# Patient Record
Sex: MALE | Race: ASIAN | NOT HISPANIC OR LATINO | URBAN - METROPOLITAN AREA
[De-identification: names, ages, dates, MRNs, and addresses within clinical notes are randomized per-mention and may not be internally consistent; named-entity substitution may affect disease eponyms.]

---

## 2021-02-24 ENCOUNTER — INPATIENT (INPATIENT)
Facility: HOSPITAL | Age: 36
LOS: 1 days | Discharge: HOME | DRG: 100 | End: 2021-02-26
Attending: PSYCHIATRY & NEUROLOGY | Admitting: PSYCHIATRY & NEUROLOGY
Payer: COMMERCIAL

## 2021-02-24 VITALS
WEIGHT: 145.06 LBS | HEART RATE: 88 BPM | HEIGHT: 65 IN | DIASTOLIC BLOOD PRESSURE: 70 MMHG | RESPIRATION RATE: 18 BRPM | TEMPERATURE: 98 F | SYSTOLIC BLOOD PRESSURE: 114 MMHG | OXYGEN SATURATION: 96 %

## 2021-02-24 DIAGNOSIS — R56.9 UNSPECIFIED CONVULSIONS: ICD-10-CM

## 2021-02-24 LAB
ALBUMIN SERPL ELPH-MCNC: 3.8 G/DL — SIGNIFICANT CHANGE UP (ref 3.3–5)
ALP SERPL-CCNC: 67 U/L — SIGNIFICANT CHANGE UP (ref 40–120)
ALT FLD-CCNC: 26 U/L — SIGNIFICANT CHANGE UP (ref 10–45)
AMPHET UR-MCNC: NEGATIVE — SIGNIFICANT CHANGE UP
ANION GAP SERPL CALC-SCNC: 12 MMOL/L — SIGNIFICANT CHANGE UP (ref 5–17)
APTT BLD: 37.7 SEC — HIGH (ref 27.5–35.5)
AST SERPL-CCNC: 32 U/L — SIGNIFICANT CHANGE UP (ref 10–40)
BARBITURATES UR SCN-MCNC: NEGATIVE — SIGNIFICANT CHANGE UP
BASOPHILS # BLD AUTO: 0.02 K/UL — SIGNIFICANT CHANGE UP (ref 0–0.2)
BASOPHILS NFR BLD AUTO: 0.5 % — SIGNIFICANT CHANGE UP (ref 0–2)
BENZODIAZ UR-MCNC: NEGATIVE — SIGNIFICANT CHANGE UP
BILIRUB SERPL-MCNC: 0.6 MG/DL — SIGNIFICANT CHANGE UP (ref 0.2–1.2)
BUN SERPL-MCNC: 17 MG/DL — SIGNIFICANT CHANGE UP (ref 7–23)
CALCIUM SERPL-MCNC: 9.2 MG/DL — SIGNIFICANT CHANGE UP (ref 8.4–10.5)
CHLORIDE SERPL-SCNC: 102 MMOL/L — SIGNIFICANT CHANGE UP (ref 96–108)
CO2 SERPL-SCNC: 24 MMOL/L — SIGNIFICANT CHANGE UP (ref 22–31)
COCAINE METAB.OTHER UR-MCNC: NEGATIVE — SIGNIFICANT CHANGE UP
CREAT SERPL-MCNC: 0.86 MG/DL — SIGNIFICANT CHANGE UP (ref 0.5–1.3)
EOSINOPHIL # BLD AUTO: 0.03 K/UL — SIGNIFICANT CHANGE UP (ref 0–0.5)
EOSINOPHIL NFR BLD AUTO: 0.8 % — SIGNIFICANT CHANGE UP (ref 0–6)
FOLATE SERPL-MCNC: 16.2 NG/ML — SIGNIFICANT CHANGE UP
GLUCOSE SERPL-MCNC: 84 MG/DL — SIGNIFICANT CHANGE UP (ref 70–99)
HCT VFR BLD CALC: 43.1 % — SIGNIFICANT CHANGE UP (ref 39–50)
HGB BLD-MCNC: 14 G/DL — SIGNIFICANT CHANGE UP (ref 13–17)
IMM GRANULOCYTES NFR BLD AUTO: 0.5 % — SIGNIFICANT CHANGE UP (ref 0–1.5)
INR BLD: 1.17 RATIO — HIGH (ref 0.88–1.16)
LYMPHOCYTES # BLD AUTO: 1.69 K/UL — SIGNIFICANT CHANGE UP (ref 1–3.3)
LYMPHOCYTES # BLD AUTO: 43.1 % — SIGNIFICANT CHANGE UP (ref 13–44)
MCHC RBC-ENTMCNC: 30.1 PG — SIGNIFICANT CHANGE UP (ref 27–34)
MCHC RBC-ENTMCNC: 32.5 GM/DL — SIGNIFICANT CHANGE UP (ref 32–36)
MCV RBC AUTO: 92.7 FL — SIGNIFICANT CHANGE UP (ref 80–100)
METHADONE UR-MCNC: NEGATIVE — SIGNIFICANT CHANGE UP
MONOCYTES # BLD AUTO: 0.43 K/UL — SIGNIFICANT CHANGE UP (ref 0–0.9)
MONOCYTES NFR BLD AUTO: 11 % — SIGNIFICANT CHANGE UP (ref 2–14)
NEUTROPHILS # BLD AUTO: 1.73 K/UL — LOW (ref 1.8–7.4)
NEUTROPHILS NFR BLD AUTO: 44.1 % — SIGNIFICANT CHANGE UP (ref 43–77)
NRBC # BLD: 0 /100 WBCS — SIGNIFICANT CHANGE UP (ref 0–0)
OPIATES UR-MCNC: NEGATIVE — SIGNIFICANT CHANGE UP
OXYCODONE UR-MCNC: NEGATIVE — SIGNIFICANT CHANGE UP
PCP SPEC-MCNC: SIGNIFICANT CHANGE UP
PCP UR-MCNC: NEGATIVE — SIGNIFICANT CHANGE UP
PLATELET # BLD AUTO: 463 K/UL — HIGH (ref 150–400)
POTASSIUM SERPL-MCNC: 4.7 MMOL/L — SIGNIFICANT CHANGE UP (ref 3.5–5.3)
POTASSIUM SERPL-SCNC: 4.7 MMOL/L — SIGNIFICANT CHANGE UP (ref 3.5–5.3)
PROT SERPL-MCNC: 7.2 G/DL — SIGNIFICANT CHANGE UP (ref 6–8.3)
PROTHROM AB SERPL-ACNC: 13.9 SEC — HIGH (ref 10.6–13.6)
RBC # BLD: 4.65 M/UL — SIGNIFICANT CHANGE UP (ref 4.2–5.8)
RBC # FLD: 12.3 % — SIGNIFICANT CHANGE UP (ref 10.3–14.5)
SARS-COV-2 IGG SERPL QL IA: POSITIVE
SARS-COV-2 IGM SERPL IA-ACNC: 24.1 INDEX — HIGH
SARS-COV-2 RNA SPEC QL NAA+PROBE: DETECTED
SODIUM SERPL-SCNC: 138 MMOL/L — SIGNIFICANT CHANGE UP (ref 135–145)
THC UR QL: NEGATIVE — SIGNIFICANT CHANGE UP
VIT B12 SERPL-MCNC: 879 PG/ML — SIGNIFICANT CHANGE UP (ref 232–1245)
WBC # BLD: 3.92 K/UL — SIGNIFICANT CHANGE UP (ref 3.8–10.5)
WBC # FLD AUTO: 3.92 K/UL — SIGNIFICANT CHANGE UP (ref 3.8–10.5)

## 2021-02-24 PROCEDURE — 93010 ELECTROCARDIOGRAM REPORT: CPT

## 2021-02-24 PROCEDURE — 99223 1ST HOSP IP/OBS HIGH 75: CPT

## 2021-02-24 PROCEDURE — 71045 X-RAY EXAM CHEST 1 VIEW: CPT | Mod: 26

## 2021-02-24 PROCEDURE — 95720 EEG PHY/QHP EA INCR W/VEEG: CPT

## 2021-02-24 PROCEDURE — 70450 CT HEAD/BRAIN W/O DYE: CPT | Mod: 26,MA

## 2021-02-24 PROCEDURE — 99285 EMERGENCY DEPT VISIT HI MDM: CPT

## 2021-02-24 RX ORDER — ACETAMINOPHEN 500 MG
325 TABLET ORAL EVERY 4 HOURS
Refills: 0 | Status: DISCONTINUED | OUTPATIENT
Start: 2021-02-24 | End: 2021-02-26

## 2021-02-24 RX ORDER — SODIUM CHLORIDE 9 MG/ML
1000 INJECTION INTRAMUSCULAR; INTRAVENOUS; SUBCUTANEOUS
Refills: 0 | Status: DISCONTINUED | OUTPATIENT
Start: 2021-02-24 | End: 2021-02-25

## 2021-02-24 RX ORDER — INFLUENZA VIRUS VACCINE 15; 15; 15; 15 UG/.5ML; UG/.5ML; UG/.5ML; UG/.5ML
0.5 SUSPENSION INTRAMUSCULAR ONCE
Refills: 0 | Status: COMPLETED | OUTPATIENT
Start: 2021-02-24 | End: 2021-02-24

## 2021-02-24 RX ORDER — ENOXAPARIN SODIUM 100 MG/ML
40 INJECTION SUBCUTANEOUS DAILY
Refills: 0 | Status: DISCONTINUED | OUTPATIENT
Start: 2021-02-24 | End: 2021-02-26

## 2021-02-24 RX ADMIN — SODIUM CHLORIDE 75 MILLILITER(S): 9 INJECTION INTRAMUSCULAR; INTRAVENOUS; SUBCUTANEOUS at 22:01

## 2021-02-24 RX ADMIN — SODIUM CHLORIDE 75 MILLILITER(S): 9 INJECTION INTRAMUSCULAR; INTRAVENOUS; SUBCUTANEOUS at 09:54

## 2021-02-24 NOTE — H&P ADULT - ASSESSMENT
Patient is a 35 yo M with pmh of seizures( 2nd grade treated with Tegretol for 6 months) and Hx of Covid + 2 weeks ago presents to [facility] for seizure. Patient went to bed around 1030 pm and woke up at 0400am to use the restroom and went back to bed. Per wife, around 0500am patient started to scream and started to have jerky movements in all extremities with arms being stiff and facing the ceiling. Soon after the patient started to foam from the mouth and then went back to sleep and snoring.  According to wife the episode lasted for 4 mins. The patient became awake after approx 7 mins when he saw himself surrounded by family and EMS. Patient CT head w/o contrast was negative. Patient will be admitted to EMU for continuos EEG monitoring. Depending on EEG results, will decided on further management with AED. Patient will get Brain MRI w/wo contrast as an outpatient, depending on EEG results.         Impression/plan:   Acute onset of general clonic seizure 2/2 to hx of seizures in the past vs recent Covid+ with pneumonia vs other etiology   Recommendation:   Admission to EMU under Dr. Castillo  Continuos EEG monitoring to look for epileptic episodes   Covid PCR and antibody ordered   routine labs ordered   U/A ordered   urine toxicology ordered   MIVF at 75cc/hr can remove, if patient is tolerating PO diet   Regular diet    Brain MRI w/wo contrast pending on EEG results, may be done as an outpatient   AED management depends on EEG results  PT/OT      Patient is a 37 yo M with pmh of seizures( 2nd grade treated with Tegretol for 6 months) and Hx of Covid + 2 weeks ago presents to [facility] for seizure. Patient went to bed around 1030 pm and woke up at 0400am to use the restroom and went back to bed. Per wife, around 0500am patient started to scream and started to have jerky movements in all extremities with arms being stiff and facing the ceiling. Soon after the patient started to foam from the mouth and then went back to sleep and snoring.  According to wife the episode lasted for 4 mins. The patient became awake after approx 7 mins when he saw himself surrounded by family and EMS. Patient CT head w/o contrast was negative. Patient will be admitted to EMU for continuos EEG monitoring will need to get brain MRI w/wo contrast. Depending on EEG results, will decided on further management with AED.         Impression/plan:   Acute onset of general clonic seizure 2/2 to hx of seizures in the past vs recent Covid+ with pneumonia vs other etiology     Recommendation:   Admission to EMU under Dr. Castillo  Continuos EEG monitoring to look for epileptic episodes   Covid PCR and antibody ordered   Brain MRI w/wo contrast   routine labs ordered   U/A ordered   urine toxicology ordered   MIVF at 75cc/hr can remove, if patient is tolerating PO diet   Regular diet     AED management depends on EEG results  PT/OT       Plan has been discussed with Neurology attending, Dr. Castillo.    Patient is a 37 yo M with pmh of seizures( 2nd grade treated with Tegretol for 6 months) and Hx of Covid + 2 weeks ago presents to [facility] for seizure. Patient went to bed around 1030 pm and woke up at 0400am to use the restroom and went back to bed. Per wife, around 0500am patient started to scream and started to have jerky movements in all extremities with arms being stiff and facing the ceiling. Soon after the patient started to foam from the mouth and then went back to sleep and snoring.  According to wife the episode lasted for 4 mins. The patient became awake after approx 7 mins when he saw himself surrounded by family and EMS. Patient CT head w/o contrast was negative. Patient will be admitted to EMU for continuos EEG monitoring  and will need to get brain MRI w/wo contrast at some point. Depending on EEG results, will decided on further management with AED.         Impression/plan:   Acute onset of general clonic seizure 2/2 to hx of seizures in the past vs recent Covid+ with pneumonia vs other etiology     Recommendation:   Admission to EMU under Dr. Castillo  Continuos EEG monitoring to look for epileptic episodes   Covid PCR and antibody ordered   Brain MRI w/wo contrast   routine labs ordered   U/A ordered   urine toxicology ordered   MIVF at 75cc/hr can remove, if patient is tolerating PO diet   Regular diet     AED management depends on EEG results  PT/OT       Plan has been discussed with Neurology attending, Dr. aCstillo.

## 2021-02-24 NOTE — H&P ADULT - HISTORY OF PRESENT ILLNESS
MRN-[id number]  HPI:   Patient is a 35 yo M with pmh of seizures( 2nd grade treated with Tegretol for 6 months) and Hx of Covid + 2 weeks ago presents to [facility] for seizure. Patient was seen at bedside, history was obtained by patient and spouse. Patient went to bed around 1030 pm and woke up at 0400am to use the restroom and went back to bed. Per wife, around 0500am patient started to scream and started to have jerky movements in all extremities with arms being stiff and  facing the ceiling. Soon after the patient started to foam from the mouth and then went back to sleep and snoring.  According to wife the episode lasted for 4 mins. The patient became awake after approx 7 mins when he saw himself surrounded by family and EMS. The patient is not able to recall the details of the event. Patient mentions he had a couple seizure episodes back in second grade, where he could recollect he suddenly fell to the ground where his classmates characterized jerky movements in UEs. Patient was on tegretol then for 6 months. Patient recently was covid postiive and had pneumonia  due to it, he was treated with antibiotics and steroids for 2 weeks. Patient mentions headaches. Denies blurred vision, tongue biting, numbness, weakness and tingling.

## 2021-02-24 NOTE — ED PROVIDER NOTE - OBJECTIVE STATEMENT
36 Yr old male with PMH of seizures (does not remember details but occurred during 2nd grade and was put on tegretol for it for a total duration of 6 months) now presents with seizure like activity at around 5 AM. The wife witnessed the seizure and says the patient was alseep when he started screaming, his body began to shake and arms turned stiff and were directed towards the ceiling. She noticed some foaming around his mouth, and called 911 who instructed her to turn the patient to his side. According to her the episode lasted for 4 mins followed by the patient going back to sleep and began snoring. The patient became awake after approx 7 mins when he saw himself surrounded by family and EMS. The patient is not able to recall the details of the event and only remembers that he got up at 4 AM to use the washroom. He had no prodromal events and post event had full recovery. There have been no similar events described in past.  The patient also had COVID pneumonia approx 12 days ago and was started on course of antibiotics and steroids for it. He was treated while at home via tele consults. He was repeat PCR tested last week and is now negative. He does not report any symptoms of fever, cough, rhinorrhea, breathlessness, chest pain, palpitations. 36 Yr old male with PMH of seizures (does not remember details but occurred during 2nd grade and was put on tegretol for it for a total duration of 6 months) now presents with seizure like activity at around 5 AM. The wife witnessed the seizure and says the patient was alseep when he started screaming, his body began to shake and arms turned stiff and were directed towards the ceiling. She noticed some foaming around his mouth, and called 911 who instructed her to turn the patient to his side. According to her the episode lasted for 4 mins followed by the patient going back to sleep and began snoring. The patient became awake after approx 7 mins when he saw himself surrounded by family and EMS. The patient is not able to recall the details of the event and only remembers that he got up at 4 AM to use the washroom. He had no prodromal events and post event had full recovery. There have been no similar events described in past.  The patient also had COVID pneumonia approx 2 weeks ago and was started on course of antibiotics and steroids for it. He was treated while at home via tele consults. His repeat PCR performed last week was negative. He does not report any symptoms of fever, cough, rhinorrhea, breathlessness, chest pain, palpitations.

## 2021-02-24 NOTE — ED PROVIDER NOTE - PROGRESS NOTE DETAILS
Spoke to wife and discussed plan. Consulted Neurology and instructed to include Urine for Tox. They will review in a while.

## 2021-02-24 NOTE — ED PROVIDER NOTE - CLINICAL SUMMARY MEDICAL DECISION MAKING FREE TEXT BOX
36 Yr old male with prior history of Seizure (in 2nd grade) now with seizure like episode at 5 AM for duration of 4 mins. On arrival VS stable and PE unremarkable. CBC CMP EKG Urine Tox CT Brain. BZD at bedside. Neurology consulted. Observe.

## 2021-02-24 NOTE — ED PROVIDER NOTE - PRO INTERPRETER NEED 2
English Quinolones Counseling:  I discussed with the patient the risks of fluoroquinolones including but not limited to GI upset, allergic reaction, drug rash, diarrhea, dizziness, photosensitivity, yeast infections, liver function test abnormalities, tendonitis/tendon rupture.

## 2021-02-24 NOTE — ED PROVIDER NOTE - NS ED ROS FT
CONSTITUTIONAL: No weakness, fevers or chills  EYES/ENT: No visual changes;  No vertigo or throat pain   NECK: No pain or stiffness  RESPIRATORY: No cough, wheezing, hemoptysis; No shortness of breath  CARDIOVASCULAR: No chest pain or palpitations  GASTROINTESTINAL: No abdominal or epigastric pain. No nausea, vomiting, or hematemesis; No diarrhea or constipation. No melena or hematochezia.  GENITOURINARY: No dysuria, frequency or hematuria  NEUROLOGICAL (+): Feels groggy otherwise no speech difficulty, numbness or weakness  SKIN: No itching, burning, rashes, or lesions     All other review of systems is negative unless indicated above.

## 2021-02-24 NOTE — ED ADULT NURSE NOTE - OBJECTIVE STATEMENT
36 y male presents from home s/p tonic clonic seizures fo r5 minutes at home. Wife woke up while patient seizing for 5 minutes as per EMS. Upon arrival; BS 95- A&Ox3. Denies N/V/D, urinary incontinence, vision change. moving all extremities- A&Ox3. Reports to COVID positive 1.5 weeks ago; negative swabbed since. Denies fevers, chills, cough. A&Ox3. Skin warm dry and intact. Breathing comfortably in bed- no distress. Abdomen soft nondistended. Safety maintained- bed locked in lowest position.

## 2021-02-24 NOTE — H&P ADULT - NSHPLABSRESULTS_GEN_ALL_CORE
Vital Signs Last 24 Hrs  T(C): 36.6 (24 Feb 2021 06:52), Max: 36.6 (24 Feb 2021 06:52)  T(F): 97.9 (24 Feb 2021 06:52), Max: 97.9 (24 Feb 2021 06:52)  HR: 84 (24 Feb 2021 06:52) (84 - 88)  BP: 113/76 (24 Feb 2021 06:52) (113/76 - 114/70)  BP(mean): --  RR: 18 (24 Feb 2021 06:52) (18 - 18)  SpO2: 98% (24 Feb 2021 06:52) (96% - 98%)            Labs:   cbc                      14.0   3.92  )-----------( 463      ( 24 Feb 2021 07:40 )             43.1     [id number]    138  |  102  |  17  ----------------------------<  84  4.7   |  24  |  0.86    Ca    9.2      24 Feb 2021 07:40    TPro  7.2  /  Alb  3.8  /  TBili  0.6  /  DBili  x   /  AST  32  /  ALT  26  /  AlkPhos  67  02-24    Coags  Lipids  A1C  CardiacMarkers    LFTsLIVER FUNCTIONS - ( 24 Feb 2021 07:40 )  Alb: 3.8 g/dL / Pro: 7.2 g/dL / ALK PHOS: 67 U/L / ALT: 26 U/L / AST: 32 U/L / GGT: x           UA  CSF  Immunological Labs    Radiology:  -CT Head  -MRI brain  -MRA brain/Carotids  -EEG  -EKG  -TTE/RYNE Vital Signs Last 24 Hrs  T(C): 36.6 (24 Feb 2021 06:52), Max: 36.6 (24 Feb 2021 06:52)  T(F): 97.9 (24 Feb 2021 06:52), Max: 97.9 (24 Feb 2021 06:52)  HR: 84 (24 Feb 2021 06:52) (84 - 88)  BP: 113/76 (24 Feb 2021 06:52) (113/76 - 114/70)  BP(mean): --  RR: 18 (24 Feb 2021 06:52) (18 - 18)  SpO2: 98% (24 Feb 2021 06:52) (96% - 98%)      Labs:   cbc                      14.0   3.92  )-----------( 463      ( 24 Feb 2021 07:40 )             43.1     [id number]    138  |  102  |  17  ----------------------------<  84  4.7   |  24  |  0.86    Ca    9.2      24 Feb 2021 07:40    TPro  7.2  /  Alb  3.8  /  TBili  0.6  /  DBili  x   /  AST  32  /  ALT  26  /  AlkPhos  67  02-24      LFTsLIVER FUNCTIONS - ( 24 Feb 2021 07:40 )  Alb: 3.8 g/dL / Pro: 7.2 g/dL / ALK PHOS: 67 U/L / ALT: 26 U/L / AST: 32 U/L / GGT: x           Radiology:  -CT Head Impression: Unremarkable noncontrast head CT.

## 2021-02-24 NOTE — ED ADULT NURSE REASSESSMENT NOTE - NS ED NURSE REASSESS COMMENT FT1
Report received from Rajat. Pt AAOx4, NAD, resp nonlabored, skin warm/dry, resting comfortably in bed. Pt denies headache, dizziness, chest pain, palpitations, SOB, abd pain, n/v/d, urinary symptoms, fevers, chills, weakness at this time. Pt awaiting CT results and neurology. Safety maintained.

## 2021-02-24 NOTE — H&P ADULT - NSHPREVIEWOFSYSTEMS_GEN_ALL_CORE
REVIEW OF SYSTEMS  General: Denies fever and chills	  Ophthalmologic: denies blurred vision   Gastrointestinal: Denies nausea, vomiting   Musculoskeletal: Denies muscle weakness  Neurological: Denies headaches, numbness, and tingling 		    ROS: Pertinent positives in HPI, all other ROS were reviewed and are negative.

## 2021-02-24 NOTE — H&P ADULT - ATTENDING COMMENTS
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  He is at high risk for repeat event  will require AED prophylaxis most likely  plan: VEEG for event characterization and localization    no AEDs for now  rescue ativan 2 mg followed by Vimpat 200

## 2021-02-24 NOTE — H&P ADULT - NSHPPHYSICALEXAM_GEN_ALL_CORE
GENERAL EXAM:  Constitutional: awake and alert. NAD  HEENT: PERRL, EOMI  Neck: Supple  Extremities: no edema, no cyanosis    NEUROLOGICAL EXAM:  MS: AAOX3, speech is fluent. extinction wnl.   CN: VFF, EOMI, PERRL, no FLIP, no APD, \  V1-3 intact, no facial asymmetry, t/p midline, SCM/trap intact.  Motor: Strength: 5/5 in UE and LE  b/l   Tone: normal. Bulk: normal.   DTR 2+ symm. in biceps, triceps, brachoradialis, patellar and achilles  Sensation: intact to LT and Temperature 4x.   Coordination: intact 4x.

## 2021-02-24 NOTE — ED PROVIDER NOTE - PHYSICAL EXAMINATION
PHYSICAL EXAM:    GENERAL: Lying in bed comfortably  HEAD:  Normocephalic  EYES: EOMI, PERRLA, conjunctiva and sclera are clear  ENT: No erythema/pallor/petechiae/lesions  NECK: Supple, No JVD  LUNG: CTA b/l; no r/r/w  HEART: RRR, +S1/S2; No m/r/g  ABDOMEN: soft, NT/ND; BS audible   EXTREMITIES:  2+ Peripheral Pulses. No clubbing, cyanosis, or edema  NERVOUS SYSTEM:  AAOx3, speech is clear. CN are intact. No sensory/motor deficits. Reflexes are normal. Gait is intact.    SKIN: No rashes or lesions

## 2021-02-25 LAB
ANION GAP SERPL CALC-SCNC: 11 MMOL/L — SIGNIFICANT CHANGE UP (ref 5–17)
BASOPHILS # BLD AUTO: 0.02 K/UL — SIGNIFICANT CHANGE UP (ref 0–0.2)
BASOPHILS NFR BLD AUTO: 0.4 % — SIGNIFICANT CHANGE UP (ref 0–2)
BUN SERPL-MCNC: 18 MG/DL — SIGNIFICANT CHANGE UP (ref 7–23)
CALCIUM SERPL-MCNC: 8.5 MG/DL — SIGNIFICANT CHANGE UP (ref 8.4–10.5)
CHLORIDE SERPL-SCNC: 106 MMOL/L — SIGNIFICANT CHANGE UP (ref 96–108)
CO2 SERPL-SCNC: 20 MMOL/L — LOW (ref 22–31)
CREAT SERPL-MCNC: 0.68 MG/DL — SIGNIFICANT CHANGE UP (ref 0.5–1.3)
EOSINOPHIL # BLD AUTO: 0.02 K/UL — SIGNIFICANT CHANGE UP (ref 0–0.5)
EOSINOPHIL NFR BLD AUTO: 0.4 % — SIGNIFICANT CHANGE UP (ref 0–6)
GLUCOSE SERPL-MCNC: 100 MG/DL — HIGH (ref 70–99)
HCT VFR BLD CALC: 41.5 % — SIGNIFICANT CHANGE UP (ref 39–50)
HGB BLD-MCNC: 13.5 G/DL — SIGNIFICANT CHANGE UP (ref 13–17)
IMM GRANULOCYTES NFR BLD AUTO: 0.2 % — SIGNIFICANT CHANGE UP (ref 0–1.5)
LYMPHOCYTES # BLD AUTO: 1.39 K/UL — SIGNIFICANT CHANGE UP (ref 1–3.3)
LYMPHOCYTES # BLD AUTO: 28.1 % — SIGNIFICANT CHANGE UP (ref 13–44)
MCHC RBC-ENTMCNC: 30.1 PG — SIGNIFICANT CHANGE UP (ref 27–34)
MCHC RBC-ENTMCNC: 32.5 GM/DL — SIGNIFICANT CHANGE UP (ref 32–36)
MCV RBC AUTO: 92.6 FL — SIGNIFICANT CHANGE UP (ref 80–100)
MONOCYTES # BLD AUTO: 0.38 K/UL — SIGNIFICANT CHANGE UP (ref 0–0.9)
MONOCYTES NFR BLD AUTO: 7.7 % — SIGNIFICANT CHANGE UP (ref 2–14)
NEUTROPHILS # BLD AUTO: 3.13 K/UL — SIGNIFICANT CHANGE UP (ref 1.8–7.4)
NEUTROPHILS NFR BLD AUTO: 63.2 % — SIGNIFICANT CHANGE UP (ref 43–77)
NRBC # BLD: 0 /100 WBCS — SIGNIFICANT CHANGE UP (ref 0–0)
PLATELET # BLD AUTO: 351 K/UL — SIGNIFICANT CHANGE UP (ref 150–400)
POTASSIUM SERPL-MCNC: 4.2 MMOL/L — SIGNIFICANT CHANGE UP (ref 3.5–5.3)
POTASSIUM SERPL-SCNC: 4.2 MMOL/L — SIGNIFICANT CHANGE UP (ref 3.5–5.3)
RBC # BLD: 4.48 M/UL — SIGNIFICANT CHANGE UP (ref 4.2–5.8)
RBC # FLD: 12.3 % — SIGNIFICANT CHANGE UP (ref 10.3–14.5)
SODIUM SERPL-SCNC: 137 MMOL/L — SIGNIFICANT CHANGE UP (ref 135–145)
WBC # BLD: 4.95 K/UL — SIGNIFICANT CHANGE UP (ref 3.8–10.5)
WBC # FLD AUTO: 4.95 K/UL — SIGNIFICANT CHANGE UP (ref 3.8–10.5)

## 2021-02-25 PROCEDURE — 99233 SBSQ HOSP IP/OBS HIGH 50: CPT

## 2021-02-25 PROCEDURE — 95720 EEG PHY/QHP EA INCR W/VEEG: CPT

## 2021-02-25 RX ORDER — LACOSAMIDE 50 MG/1
1 TABLET ORAL
Qty: 60 | Refills: 0
Start: 2021-02-25 | End: 2021-03-26

## 2021-02-25 RX ORDER — LACOSAMIDE 50 MG/1
50 TABLET ORAL
Refills: 0 | Status: DISCONTINUED | OUTPATIENT
Start: 2021-02-25 | End: 2021-02-26

## 2021-02-25 RX ORDER — LACOSAMIDE 50 MG/1
200 TABLET ORAL ONCE
Refills: 0 | Status: DISCONTINUED | OUTPATIENT
Start: 2021-02-25 | End: 2021-02-26

## 2021-02-25 RX ADMIN — LACOSAMIDE 50 MILLIGRAM(S): 50 TABLET ORAL at 15:26

## 2021-02-25 RX ADMIN — ENOXAPARIN SODIUM 40 MILLIGRAM(S): 100 INJECTION SUBCUTANEOUS at 11:05

## 2021-02-25 RX ADMIN — LACOSAMIDE 50 MILLIGRAM(S): 50 TABLET ORAL at 21:20

## 2021-02-25 NOTE — PROGRESS NOTE ADULT - ASSESSMENT
37 yo M with pmh of seizures( 2nd grade treated with Tegretol for 6 months) and Hx of Covid + 2 weeks ago presents to [facility] for seizure. Patient went to bed around 1030 pm and woke up at 0400am to use the restroom and went back to bed. Per wife, around 0500am patient started to scream and started to have jerky movements in all extremities with arms being stiff and facing the ceiling. Soon after the patient started to foam from the mouth and then went back to sleep and snoring.  According to wife the episode lasted for 4 mins. The patient became awake after approx 7 mins when he saw himself surrounded by family and EMS. Patient CT head w/o contrast was negative. Patient will be admitted to EMU for continuos EEG monitoring  and will need to get brain MRI w/wo contrast at some point. Depending on EEG results, will decided on further management with AED.         Impression/plan:   Acute onset of general clonic seizure 2/2 to hx of seizures in the past vs recent Covid+ with pneumonia vs other etiology     Recommendation:   # Breakthrough seizure in the setting of prior history of seizures  [x] vEEG  [] Brain MRI w/wo contrast -- will plan for MRI towards end of admission  [x] Utox negative  [] PT/OT -- no anticipated needs  [x] COVID19+ (per infection control this is shedding)    #DVT prophylaxis   [x] Lovenox 40mg SubQ    Obtain screening lower extremity venous ultrasound in patients who meet 1 or more of the following criteria as patient is high risk for DVT/PE on admission:   [] History of DVT/PE  [] Hypercoagulable states (Factor V Leiden, Cancer, OCP, etc. )  [] Prolonged immobility (hemiplegia/hemiparesis/post operative or any other extended immobilization)  [] Transferred from outside facility (Rehab or Long term care)  [] Age </= to 50

## 2021-02-25 NOTE — PROGRESS NOTE ADULT - SUBJECTIVE AND OBJECTIVE BOX
Subjective: Interval History - No events overnight    Objective:   Vital Signs Last 24 Hrs  T(C): 36.6 (25 Feb 2021 08:37), Max: 36.8 (24 Feb 2021 19:56)  T(F): 97.8 (25 Feb 2021 08:37), Max: 98.3 (24 Feb 2021 19:56)  HR: 59 (25 Feb 2021 08:37) (59 - 80)  BP: 114/69 (25 Feb 2021 08:37) (98/60 - 114/69)  BP(mean): --  RR: 18 (25 Feb 2021 08:37) (16 - 18)  SpO2: 98% (25 Feb 2021 08:37) (96% - 100%)    General Exam:   General appearance: No acute distress                   Neurological Exam:  MS: AAOX3, speech is fluent. extinction wnl.   CN: VFF, EOMI, PERRL, no FLIP, no APD, \  V1-3 intact, no facial asymmetry, t/p midline, SCM/trap intact.  Motor: Strength: 5/5 in UE and LE  b/l   Tone: normal. Bulk: normal.   DTR 2+ symm. in biceps, triceps, brachoradialis, patellar and achilles  Sensation: intact to LT and Temperature 4x.     Other:    02-25    137  |  106  |  18  ----------------------------<  100<H>  4.2   |  20<L>  |  0.68    Ca    8.5      25 Feb 2021 07:27    TPro  7.2  /  Alb  3.8  /  TBili  0.6  /  DBili  x   /  AST  32  /  ALT  26  /  AlkPhos  67  02-24    LIVER FUNCTIONS - ( 24 Feb 2021 07:40 )  Alb: 3.8 g/dL / Pro: 7.2 g/dL / ALK PHOS: 67 U/L / ALT: 26 U/L / AST: 32 U/L / GGT: x                                 13.5   4.95  )-----------( 351      ( 25 Feb 2021 07:28 )             41.5       MEDICATIONS  (STANDING):  enoxaparin Injectable 40 milliGRAM(s) SubCutaneous daily  influenza   Vaccine 0.5 milliLiter(s) IntraMuscular once  sodium chloride 0.9%. 1000 milliLiter(s) (75 mL/Hr) IV Continuous <Continuous>    MEDICATIONS  (PRN):  acetaminophen   Tablet .. 325 milliGRAM(s) Oral every 4 hours PRN Temp greater or equal to 38.5C (101.3F), Mild Pain (1 - 3), Moderate Pain (4 - 6)  lacosamide Injectable 200 milliGRAM(s) IV Push once PRN gtc not responding to ativan  LORazepam   Injectable 2 milliGRAM(s) IV Push once PRN gtc > 3 mins

## 2021-02-25 NOTE — EEG REPORT - NS EEG TEXT BOX
Starting: Day 1   17:14 on 02/24/2021 to 08:00 on 02/24/2021    AEDs:     none      _____________________________________________________________  STUDY INTERPRETATION    Findings: The background was continuous, spontaneously variable and reactive. During wakefulness, the posterior dominant rhythm consisted of symmetric, well-modulated 10 Hz activity, with amplitude to 30 uV, that attenuated to eye opening.  Low amplitude frontal beta was noted in wakefulness.      Background Slowing:  No generalized background slowing was present.    Focal Slowing:   None were present.    Sleep Background:  Drowsiness was characterized by fragmentation, attenuation, and slowing of the background activity.    Sleep was characterized by the presence of vertex waves, symmetric sleep spindles and K-complexes.    Other Non-Epileptiform Findings:  None were present.    Interictal Epileptiform Activity:   Frequent left frontal max C3 spike wave discharges  Frequent left frontal max C3 sharp wave discharges    Events:  Clinical events: None recorded.  Seizures: None recorded.    Activation Procedures:   Hyperventilation was not performed.    Photic stimulation was not performed.     Artifacts:  Intermittent myogenic and movement artifacts were noted.    ECG:  The heart rate on single channel ECG was predominantly between 40-50 BPM.    _____________________________________________________________  EEG SUMMARY/CLASSIFICATION    Abnormal EEG in the awake, drowsy and asleep states.  - spike wave discharge, frequent, left frontal  - sharp wave discharge, frequent left frontal   _____________________________________________________________  EEG IMPRESSION/CLINICAL CORRELATE    EGG suggestive of left frontal area of cortical hyperexcitability indicating increased risk of seizure onset from this area. No seizure events seen    Eloy Spears MD PGY-5  Epilepsy Fellow    This Preliminary report is based on fellow review. Final report pending attending review.    Reading Room: [phone]  On Call Service After Hours: [phone]   Starting: Day 1   17:14 on 02/24/2021 to 08:00 on 02/24/2021    AEDs:     none      _____________________________________________________________  STUDY INTERPRETATION    Findings: The background was continuous, spontaneously variable and reactive. During wakefulness, the posterior dominant rhythm consisted of symmetric, well-modulated 10 Hz activity, with amplitude to 30 uV, that attenuated to eye opening.  Low amplitude frontal beta was noted in wakefulness.      Background Slowing:  No generalized background slowing was present.    Focal Slowing:   None were present.    Sleep Background:  Drowsiness was characterized by fragmentation, attenuation, and slowing of the background activity.    Sleep was characterized by the presence of vertex waves, symmetric sleep spindles and K-complexes.    Other Non-Epileptiform Findings:  None were present.    Interictal Epileptiform Activity:   Frequent left frontal max C3 spike wave discharges  Frequent left frontal max C3 sharp wave discharges    Events:  Clinical events: None recorded.  Seizures: None recorded.    Activation Procedures:   Hyperventilation was not performed.    Photic stimulation was not performed.     Artifacts:  Intermittent myogenic and movement artifacts were noted.    ECG:  The heart rate on single channel ECG was predominantly between 40-50 BPM.    _____________________________________________________________  EEG SUMMARY/CLASSIFICATION    Abnormal EEG in the awake, drowsy and asleep states.  - spike wave discharge, frequent, left frontal  - sharp wave discharge, frequent left frontal   _____________________________________________________________  EEG IMPRESSION/CLINICAL CORRELATE    EGG suggestive of left frontal area of cortical hyperexcitability indicating increased risk of seizure onset from this area. No seizure events seen    Eloy Spears MD PGY-5  Epilepsy Fellow    Nii Ambriz MD PhD  Director, Epilepsy Division, [facility] EEG Reading Room Ph#: [phone]  Epilepsy Answering Service after 5PM and before 8:30AM: Ph#: [phone]

## 2021-02-26 ENCOUNTER — TRANSCRIPTION ENCOUNTER (OUTPATIENT)
Age: 36
End: 2021-02-26

## 2021-02-26 VITALS
HEART RATE: 74 BPM | TEMPERATURE: 98 F | RESPIRATION RATE: 18 BRPM | DIASTOLIC BLOOD PRESSURE: 78 MMHG | OXYGEN SATURATION: 99 % | SYSTOLIC BLOOD PRESSURE: 112 MMHG

## 2021-02-26 PROCEDURE — 76377 3D RENDER W/INTRP POSTPROCES: CPT | Mod: 26,59

## 2021-02-26 PROCEDURE — 85730 THROMBOPLASTIN TIME PARTIAL: CPT

## 2021-02-26 PROCEDURE — U0003: CPT

## 2021-02-26 PROCEDURE — 82746 ASSAY OF FOLIC ACID SERUM: CPT

## 2021-02-26 PROCEDURE — 82607 VITAMIN B-12: CPT

## 2021-02-26 PROCEDURE — 99238 HOSP IP/OBS DSCHRG MGMT 30/<: CPT

## 2021-02-26 PROCEDURE — 80053 COMPREHEN METABOLIC PANEL: CPT

## 2021-02-26 PROCEDURE — 71045 X-RAY EXAM CHEST 1 VIEW: CPT

## 2021-02-26 PROCEDURE — 93005 ELECTROCARDIOGRAM TRACING: CPT

## 2021-02-26 PROCEDURE — 82962 GLUCOSE BLOOD TEST: CPT

## 2021-02-26 PROCEDURE — 76377 3D RENDER W/INTRP POSTPROCES: CPT

## 2021-02-26 PROCEDURE — 70553 MRI BRAIN STEM W/O & W/DYE: CPT

## 2021-02-26 PROCEDURE — 85025 COMPLETE CBC W/AUTO DIFF WBC: CPT

## 2021-02-26 PROCEDURE — 70553 MRI BRAIN STEM W/O & W/DYE: CPT | Mod: 26

## 2021-02-26 PROCEDURE — 70450 CT HEAD/BRAIN W/O DYE: CPT

## 2021-02-26 PROCEDURE — U0005: CPT

## 2021-02-26 PROCEDURE — 86769 SARS-COV-2 COVID-19 ANTIBODY: CPT

## 2021-02-26 PROCEDURE — 99285 EMERGENCY DEPT VISIT HI MDM: CPT | Mod: 25

## 2021-02-26 PROCEDURE — 95700 EEG CONT REC W/VID EEG TECH: CPT

## 2021-02-26 PROCEDURE — 95715 VEEG EA 12-26HR INTMT MNTR: CPT

## 2021-02-26 PROCEDURE — 85610 PROTHROMBIN TIME: CPT

## 2021-02-26 PROCEDURE — 80307 DRUG TEST PRSMV CHEM ANLYZR: CPT

## 2021-02-26 PROCEDURE — 80048 BASIC METABOLIC PNL TOTAL CA: CPT

## 2021-02-26 RX ADMIN — ENOXAPARIN SODIUM 40 MILLIGRAM(S): 100 INJECTION SUBCUTANEOUS at 13:01

## 2021-02-26 RX ADMIN — LACOSAMIDE 50 MILLIGRAM(S): 50 TABLET ORAL at 06:44

## 2021-02-26 NOTE — DISCHARGE NOTE PROVIDER - CARE PROVIDER_API CALL
Rachel Castillo)  EEGEpilepsy; Neurology  [address], [address]  [place] [address]  Phone: [phone]  Fax: ()-  Follow Up Time: 2 weeks

## 2021-02-26 NOTE — DISCHARGE NOTE PROVIDER - NSDCCAREPROVSEEN_GEN_ALL_CORE_FT
Patient had pressure ulcer reopened, patient just wants to use cream and pressure relief cushion    [phone]   Rachel Castillo

## 2021-02-26 NOTE — DISCHARGE NOTE PROVIDER - NSDCCPCAREPLAN_GEN_ALL_CORE_FT
PRINCIPAL DISCHARGE DIAGNOSIS  Diagnosis: Seizure  Assessment and Plan of Treatment: Please follow up with neurologist in next 2 weeks. Continue taking medications as prescribed. Contact your Neurologist, Primary Care Provider or report to Emergency Room if you experience new or worsening symptoms. Monitor your blood pressure. You may be at risk for falling, make changes to your home to help you walk easier. No operation of motor vehicles or heavy machinery for 6 months to 1 year. Follow up with DMV.

## 2021-02-26 NOTE — PROGRESS NOTE ADULT - SUBJECTIVE AND OBJECTIVE BOX
Subjective: Interval History - No events overnight    Objective:   Vital Signs Last 24 Hrs  T(C): 36.6 (25 Feb 2021 08:37), Max: 36.8 (24 Feb 2021 19:56)  T(F): 97.8 (25 Feb 2021 08:37), Max: 98.3 (24 Feb 2021 19:56)  HR: 59 (25 Feb 2021 08:37) (59 - 80)  BP: 114/69 (25 Feb 2021 08:37) (98/60 - 114/69)  BP(mean): --  RR: 18 (25 Feb 2021 08:37) (16 - 18)  SpO2: 98% (25 Feb 2021 08:37) (96% - 100%)    General Exam:   General appearance: No acute distress                   Neurological Exam:  MS: AAOX3, speech is fluent. extinction wnl.   CN: VFF, EOMI, PERRL, no FLIP, no APD, \  V1-3 intact, no facial asymmetry, t/p midline, SCM/trap intact.  Motor: Strength: 5/5 in UE and LE  b/l   Tone: normal. Bulk: normal.   DTR 2+ symm. in biceps, triceps, brachoradialis, patellar and achilles  Sensation: intact to LT and Temperature 4x.     Other:    02-25    137  |  106  |  18  ----------------------------<  100<H>  4.2   |  20<L>  |  0.68    Ca    8.5      25 Feb 2021 07:27    TPro  7.2  /  Alb  3.8  /  TBili  0.6  /  DBili  x   /  AST  32  /  ALT  26  /  AlkPhos  67  02-24    LIVER FUNCTIONS - ( 24 Feb 2021 07:40 )  Alb: 3.8 g/dL / Pro: 7.2 g/dL / ALK PHOS: 67 U/L / ALT: 26 U/L / AST: 32 U/L / GGT: x                                 13.5   4.95  )-----------( 351      ( 25 Feb 2021 07:28 )             41.5       MEDICATIONS  (STANDING):  enoxaparin Injectable 40 milliGRAM(s) SubCutaneous daily  influenza   Vaccine 0.5 milliLiter(s) IntraMuscular once  sodium chloride 0.9%. 1000 milliLiter(s) (75 mL/Hr) IV Continuous <Continuous>    MEDICATIONS  (PRN):  acetaminophen   Tablet .. 325 milliGRAM(s) Oral every 4 hours PRN Temp greater or equal to 38.5C (101.3F), Mild Pain (1 - 3), Moderate Pain (4 - 6)  lacosamide Injectable 200 milliGRAM(s) IV Push once PRN gtc not responding to ativan  LORazepam   Injectable 2 milliGRAM(s) IV Push once PRN gtc > 3 mins   ***PROGRESS NOTE***     Subjective: No acute events overnight.     MEDICATIONS  (STANDING):  enoxaparin Injectable 40 milliGRAM(s) SubCutaneous daily  influenza   Vaccine 0.5 milliLiter(s) IntraMuscular once  lacosamide 50 milliGRAM(s) Oral two times a day    MEDICATIONS  (PRN):  acetaminophen   Tablet .. 325 milliGRAM(s) Oral every 4 hours PRN Temp greater or equal to 38.5C (101.3F), Mild Pain (1 - 3), Moderate Pain (4 - 6)  lacosamide Injectable 200 milliGRAM(s) IV Push once PRN gtc not responding to ativan  LORazepam   Injectable 2 milliGRAM(s) IV Push once PRN gtc > 3 mins    Vital Signs Last 24 Hrs  T(C): 36.5 (26 Feb 2021 04:05), Max: 36.8 (25 Feb 2021 20:22)  T(F): 97.7 (26 Feb 2021 04:05), Max: 98.3 (25 Feb 2021 20:22)  HR: 64 (26 Feb 2021 04:05) (59 - 68)  BP: 103/64 (26 Feb 2021 04:05) (101/67 - 128/82)  BP(mean): --  RR: 18 (26 Feb 2021 04:05) (18 - 18)  SpO2: 98% (26 Feb 2021 04:05) (98% - 99%)    General Exam:   General appearance: No acute distress                   Neurological Exam:  MS: AAOX3, speech is fluent. extinction wnl.   CN: VFF, EOMI, PERRL, no FLIP, no APD, \  V1-3 intact, no facial asymmetry, t/p midline, SCM/trap intact.  Motor: Strength: 5/5 in UE and LE  b/l   Tone: normal. Bulk: normal.   DTR 2+ symm. in biceps, triceps, brachoradialis, patellar and achilles  Sensation: intact to LT and Temperature 4x.       LABS:                        13.5   4.95  )-----------( 351      ( 25 Feb 2021 07:28 )             41.5     02-25    137  |  106  |  18  ----------------------------<  100<H>  4.2   |  20<L>  |  0.68    Ca    8.5      25 Feb 2021 07:27      PT/INR - ( 24 Feb 2021 12:52 )   PT: 13.9 sec;   INR: 1.17 ratio         PTT - ( 24 Feb 2021 12:52 )  PTT:37.7 sec    TPro  7.2  /  Alb  3.8  /  TBili  0.6  /  DBili  x   /  AST  32  /  ALT  26  /  AlkPhos  67  02-24    LIVER FUNCTIONS - ( 24 Feb 2021 07:40 )  Alb: 3.8 g/dL / Pro: 7.2 g/dL / ALK PHOS: 67 U/L / ALT: 26 U/L / AST: 32 U/L / GGT: x             Imaging:     EEG SUMMARY/CLASSIFICATION 2/25/21:   Abnormal EEG in the awake, drowsy and asleep states.  - spike wave discharge, frequent, left frontal  - sharp wave discharge, frequent left frontal  ***PROGRESS NOTE***     Subjective: No acute events overnight.     MEDICATIONS  (STANDING):  enoxaparin Injectable 40 milliGRAM(s) SubCutaneous daily  influenza   Vaccine 0.5 milliLiter(s) IntraMuscular once  lacosamide 50 milliGRAM(s) Oral two times a day    MEDICATIONS  (PRN):  acetaminophen   Tablet .. 325 milliGRAM(s) Oral every 4 hours PRN Temp greater or equal to 38.5C (101.3F), Mild Pain (1 - 3), Moderate Pain (4 - 6)  lacosamide Injectable 200 milliGRAM(s) IV Push once PRN gtc not responding to ativan  LORazepam   Injectable 2 milliGRAM(s) IV Push once PRN gtc > 3 mins    Vital Signs Last 24 Hrs  T(C): 36.5 (26 Feb 2021 04:05), Max: 36.8 (25 Feb 2021 20:22)  T(F): 97.7 (26 Feb 2021 04:05), Max: 98.3 (25 Feb 2021 20:22)  HR: 64 (26 Feb 2021 04:05) (59 - 68)  BP: 103/64 (26 Feb 2021 04:05) (101/67 - 128/82)  BP(mean): --  RR: 18 (26 Feb 2021 04:05) (18 - 18)  SpO2: 98% (26 Feb 2021 04:05) (98% - 99%)    General Exam:   General appearance: No acute distress                   Neurological Exam:  MS: AAOX3, speech is fluent. extinction wnl.   CN: VFF, EOMI, PERRL, no FLIP, no APD, \  V1-3 intact, no facial asymmetry, t/p midline, SCM/trap intact.  Motor: Strength: 5/5 in UE and LE  b/l   Tone: normal. Bulk: normal.   DTR 2+ symm. in biceps, triceps, brachoradialis, patellar and achilles  Sensation: intact to LT and Temperature 4x.       LABS:                        13.5   4.95  )-----------( 351      ( 25 Feb 2021 07:28 )             41.5     02-25    137  |  106  |  18  ----------------------------<  100<H>  4.2   |  20<L>  |  0.68    Ca    8.5      25 Feb 2021 07:27      PT/INR - ( 24 Feb 2021 12:52 )   PT: 13.9 sec;   INR: 1.17 ratio         PTT - ( 24 Feb 2021 12:52 )  PTT:37.7 sec    TPro  7.2  /  Alb  3.8  /  TBili  0.6  /  DBili  x   /  AST  32  /  ALT  26  /  AlkPhos  67  02-24    LIVER FUNCTIONS - ( 24 Feb 2021 07:40 )  Alb: 3.8 g/dL / Pro: 7.2 g/dL / ALK PHOS: 67 U/L / ALT: 26 U/L / AST: 32 U/L / GGT: x             Imaging:     MR Brain w/w/o IV contrast Epilepsy Protocol 2/26/21: Mild volume loss, findings suggestive of left hippocampal malrotation, decreased size left fornical column and left fornix posterior commissural fibers, minimal microvascular disease or migraine sequela, posterior fossa arachnoid cyst, no hemorrhage, restricted diffusion or midline shift.    EEG SUMMARY/CLASSIFICATION 2/25/21:   Abnormal EEG in the awake, drowsy and asleep states.  - spike wave discharge, frequent, left frontal  - sharp wave discharge, frequent left frontal  ***PROGRESS NOTE***     Subjective: No acute events overnight.     MEDICATIONS  (STANDING):  enoxaparin Injectable 40 milliGRAM(s) SubCutaneous daily  influenza   Vaccine 0.5 milliLiter(s) IntraMuscular once  lacosamide 50 milliGRAM(s) Oral two times a day    MEDICATIONS  (PRN):  acetaminophen   Tablet .. 325 milliGRAM(s) Oral every 4 hours PRN Temp greater or equal to 38.5C (101.3F), Mild Pain (1 - 3), Moderate Pain (4 - 6)  lacosamide Injectable 200 milliGRAM(s) IV Push once PRN gtc not responding to ativan  LORazepam   Injectable 2 milliGRAM(s) IV Push once PRN gtc > 3 mins    Vital Signs Last 24 Hrs  T(C): 36.5 (26 Feb 2021 04:05), Max: 36.8 (25 Feb 2021 20:22)  T(F): 97.7 (26 Feb 2021 04:05), Max: 98.3 (25 Feb 2021 20:22)  HR: 64 (26 Feb 2021 04:05) (59 - 68)  BP: 103/64 (26 Feb 2021 04:05) (101/67 - 128/82)  RR: 18 (26 Feb 2021 04:05) (18 - 18)  SpO2: 98% (26 Feb 2021 04:05) (98% - 99%)    General Exam:   General appearance: No acute distress                   Neurological Exam:  MS: AAOX3, speech is fluent. extinction wnl.   CN: VFF, EOMI, PERRL, no FLIP, no APD  V1-3 intact, no facial asymmetry, t/p midline, SCM/trap intact.  Motor: Strength: 5/5 in UE and LE  b/l   Tone: normal. Bulk: normal.   DTR 2+ symm. in biceps, triceps, brachoradialis, patellar and achilles  Sensation: intact to LT and Temperature 4x.       LABS:                        13.5   4.95  )-----------( 351      ( 25 Feb 2021 07:28 )             41.5     02-25    137  |  106  |  18  ----------------------------<  100<H>  4.2   |  20<L>  |  0.68    Ca    8.5      25 Feb 2021 07:27      PT/INR - ( 24 Feb 2021 12:52 )   PT: 13.9 sec;   INR: 1.17 ratio         PTT - ( 24 Feb 2021 12:52 )  PTT:37.7 sec    TPro  7.2  /  Alb  3.8  /  TBili  0.6  /  DBili  x   /  AST  32  /  ALT  26  /  AlkPhos  67  02-24    LIVER FUNCTIONS - ( 24 Feb 2021 07:40 )  Alb: 3.8 g/dL / Pro: 7.2 g/dL / ALK PHOS: 67 U/L / ALT: 26 U/L / AST: 32 U/L / GGT: x             Imaging:     MR Brain w/w/o IV contrast Epilepsy Protocol 2/26/21: Mild volume loss, findings suggestive of left hippocampal malrotation, decreased size left fornical column and left fornix posterior commissural fibers, minimal microvascular disease or migraine sequela, posterior fossa arachnoid cyst, no hemorrhage, restricted diffusion or midline shift.    EEG SUMMARY/CLASSIFICATION 2/25/21:   Abnormal EEG in the awake, drowsy and asleep states.  - spike wave discharge, frequent, left frontal  - sharp wave discharge, frequent left frontal

## 2021-02-26 NOTE — DISCHARGE NOTE NURSING/CASE MANAGEMENT/SOCIAL WORK - PATIENT PORTAL LINK FT
You can access the FollowMyHealth Patient Portal offered by [facility] by registering at the following website: [email or web address]. By joining [employer]’s FollowMyHealth portal, you will also be able to view your health information using other applications (apps) compatible with our system.

## 2021-02-26 NOTE — EEG REPORT - NS EEG TEXT BOX
Starting: Day 2   08:00 on 02/25/2021 to 08:00 on 02/24/2021 (Duration 23h 33m)    AEDs:     none      _____________________________________________________________  STUDY INTERPRETATION    Findings: The background was continuous, spontaneously variable and reactive. During wakefulness, the posterior dominant rhythm consisted of symmetric, well-modulated 10 Hz activity, with amplitude to 30 uV, that attenuated to eye opening.  Low amplitude frontal beta was noted in wakefulness.      Background Slowing:  No generalized background slowing was present.    Focal Slowing:   None were present.    Sleep Background:  Drowsiness was characterized by fragmentation, attenuation, and slowing of the background activity.    Sleep was characterized by the presence of vertex waves, symmetric sleep spindles and K-complexes.    Other Non-Epileptiform Findings:  None were present.    Interictal Epileptiform Activity:   Frequent left frontal max C3 spike wave discharges  Frequent left frontal max C3 sharp wave discharges    Events:  Clinical events: None recorded.  Seizures: None recorded.    Activation Procedures:   Hyperventilation was not performed.    Photic stimulation was not performed.     Artifacts:  Intermittent myogenic and movement artifacts were noted.    ECG:  The heart rate on single channel ECG was predominantly between 40-50 BPM.    _____________________________________________________________  EEG SUMMARY/CLASSIFICATION    Abnormal EEG in the awake, drowsy and asleep states.  - spike wave discharge, frequent, left frontal  - sharp wave discharge, frequent left frontal   _____________________________________________________________  EEG IMPRESSION/CLINICAL CORRELATE    EGG suggestive of left frontal area of cortical hyperexcitability indicating increased risk of seizure onset from this area. No seizure events seen    Eloy Spears MD PGY-5  Epilepsy Fellow    Nii Ambriz MD PhD  Director, Epilepsy Division, [facility] EEG Reading Room Ph#: [phone]  Epilepsy Answering Service after 5PM and before 8:30AM: Ph#: [phone]

## 2021-02-26 NOTE — DISCHARGE NOTE PROVIDER - HOSPITAL COURSE
37 yo M with pmh of seizures( 2nd grade treated with Tegretol for 6 months) and Hx of Covid + 2 weeks ago presents to [facility] for seizure. Patient went to bed around 1030 pm and woke up at 0400am to use the restroom and went back to bed. Per wife, around 0500am patient started to scream and started to have jerky movements in all extremities with arms being stiff and facing the ceiling. Soon after the patient started to foam from the mouth and then went back to sleep and snoring.  According to wife the episode lasted for 4 mins. The patient became awake after approx 7 mins when he saw himself surrounded by family and EMS. Patient CT head w/o contrast was negative. Patient will be admitted to EMU for continuos EEG monitoring  and will need to get brain MRI w/wo contrast at some point. Depending on EEG results, will decided on further management with AED. Seizure Semiology: jerky movements bilateral UE/LE extremities with stiffness, generalized tonic clonic     Impression: Acute onset of general clonic seizure 2/2 to hx of seizures in the past vs recent COVID+ with pneumonia vs other etiology     EEG SUMMARY/CLASSIFICATION 2/25/21:   Abnormal EEG in the awake, drowsy and asleep states.  - spike wave discharge, frequent, left frontal  - sharp wave discharge, frequent left frontal     Patient was monitored on vEEG for 2 days, EEG reports consistent with left frontal spikes. Patient was started on Vimpat 50 mg BID. vEEG discontinued, MRI Brain w/wo IV contrast 2/26/21 results ****. Urine toxiciology negative. COVID 19 PCR results positive secondary to recent infection, patient has already been treated for COVID 19. Per infection control this is shedding. Patient is currently stable and ready for discharge. He can follow up with Neurologist outpatient. Please note no driving or operating of heavy machinery for 1 year or until cleared by Neurologist.          37 yo M with pmh of seizures( 2nd grade treated with Tegretol for 6 months) and Hx of Covid + 2 weeks ago presents to [facility] for seizure. Patient went to bed around 1030 pm and woke up at 0400am to use the restroom and went back to bed. Per wife, around 0500am patient started to scream and started to have jerky movements in all extremities with arms being stiff and facing the ceiling. Soon after the patient started to foam from the mouth and then went back to sleep and snoring.  According to wife the episode lasted for 4 mins. The patient became awake after approx 7 mins when he saw himself surrounded by family and EMS. Patient CT head w/o contrast was negative. Patient will be admitted to EMU for continuos EEG monitoring  and will need to get brain MRI w/wo contrast at some point. Depending on EEG results, will decided on further management with AED. Seizure Semiology: jerky movements bilateral UE/LE extremities with stiffness, generalized tonic clonic     Impression: Acute onset of general clonic seizure 2/2 to hx of seizures in the past vs recent COVID+ with pneumonia vs other etiology     EEG SUMMARY/CLASSIFICATION 2/25/21:   Abnormal EEG in the awake, drowsy and asleep states.  - spike wave discharge, frequent, left frontal  - sharp wave discharge, frequent left frontal     MR Brain w/w/o IV contrast Epilepsy Protocol 2/26/21: Mild volume loss, findings suggestive of left hippocampal malrotation, decreased size left fornical column and left fornix posterior commissural fibers, minimal microvascular disease or migraine sequela, posterior fossa arachnoid cyst, no hemorrhage, restricted diffusion or midline shift.    Patient was monitored on vEEG for 2 days, EEG reports consistent with left frontal spikes. Patient was started on Vimpat 50 mg BID. vEEG discontinued, MRI Brain w/wo IV contrast 2/26/21 results noted above. Urine toxiciology negative. COVID 19 PCR results positive secondary to recent infection, patient has already been treated for COVID 19. Per infection control this is shedding. Patient is currently stable and ready for discharge. He can follow up with Neurologist outpatient, continue taking AED as prescribed above. Please note no driving or operating of heavy machinery for 1 year or until cleared by Neurologist.

## 2021-02-26 NOTE — PROGRESS NOTE ADULT - ASSESSMENT
37 yo M with pmh of seizures( 2nd grade treated with Tegretol for 6 months) and Hx of Covid + 2 weeks ago presents to [facility] for seizure. Patient went to bed around 1030 pm and woke up at 0400am to use the restroom and went back to bed. Per wife, around 0500am patient started to scream and started to have jerky movements in all extremities with arms being stiff and facing the ceiling. Soon after the patient started to foam from the mouth and then went back to sleep and snoring.  According to wife the episode lasted for 4 mins. The patient became awake after approx 7 mins when he saw himself surrounded by family and EMS. Patient CT head w/o contrast was negative. Patient will be admitted to EMU for continuos EEG monitoring  and will need to get brain MRI w/wo contrast at some point. Depending on EEG results, will decided on further management with AED.         Impression/plan:   Acute onset of general clonic seizure 2/2 to hx of seizures in the past vs recent Covid+ with pneumonia vs other etiology     Recommendation:   # Breakthrough seizure in the setting of prior history of seizures  [x] vEEG  [] Brain MRI w/wo contrast -- will plan for MRI towards end of admission  [x] Utox negative  [] PT/OT -- no anticipated needs  [x] COVID19+ (per infection control this is shedding)    #DVT prophylaxis   [x] Lovenox 40mg SubQ    Obtain screening lower extremity venous ultrasound in patients who meet 1 or more of the following criteria as patient is high risk for DVT/PE on admission:   [] History of DVT/PE  [] Hypercoagulable states (Factor V Leiden, Cancer, OCP, etc. )  [] Prolonged immobility (hemiplegia/hemiparesis/post operative or any other extended immobilization)  [] Transferred from outside facility (Rehab or Long term care)  [] Age </= to 50          35 yo M with pmh of seizures( 2nd grade treated with Tegretol for 6 months) and Hx of Covid + 2 weeks ago presents to [facility] for seizure. Patient went to bed around 1030 pm and woke up at 0400am to use the restroom and went back to bed. Per wife, around 0500am patient started to scream and started to have jerky movements in all extremities with arms being stiff and facing the ceiling. Soon after the patient started to foam from the mouth and then went back to sleep and snoring.  According to wife the episode lasted for 4 mins. The patient became awake after approx 7 mins when he saw himself surrounded by family and EMS. Patient CT head w/o contrast was negative. Patient will be admitted to EMU for continuos EEG monitoring  and will need to get brain MRI w/wo contrast at some point. Depending on EEG results, will decided on further management with AED.     Impression: Acute onset of general clonic seizure 2/2 to hx of seizures in the past vs recent COVID+ with pneumonia vs other etiology     Seizure Semiology: jerky movements bilateral UE/LE extremities with stiffness, generalized tonic clonic     Problem/Plan 1: Epilepsy - breakthrough seizures, recent covid infection and pneumonia. Was monitored on vEEG for past 2 days. Will discontinue vEEG today   [] vEEG notable for Left frontal spikes   [] Brain MRI w/wo IV contrast - scheduled for 9 AM this morning  [x] Urine toxicology - negative       Problem/Plan 2: Positive COVID PCR - patient was recently infected with COVID 19, he is s/p treatment and still having positive PCR results.   [x] COVID19+ (per infection control this is shedding)    Core measures:   [x] Lovenox 40mg SubQ  [x] PT/OT -- no anticipated needs             35 yo M with pmh of seizures( 2nd grade treated with Tegretol for 6 months) and Hx of Covid + 2 weeks ago presents to [facility] for seizure. Patient went to bed around 1030 pm and woke up at 0400am to use the restroom and went back to bed. Per wife, around 0500am patient started to scream and started to have jerky movements in all extremities with arms being stiff and facing the ceiling. Soon after the patient started to foam from the mouth and then went back to sleep and snoring.  According to wife the episode lasted for 4 mins. The patient became awake after approx 7 mins when he saw himself surrounded by family and EMS. Patient CT head w/o contrast was negative. Patient will be admitted to EMU for continuos EEG monitoring  and will need to get brain MRI w/wo contrast at some point. Depending on EEG results, will decided on further management with AED.     Impression: Acute onset of general clonic seizure 2/2 to hx of seizures in the past vs recent COVID+ with pneumonia vs other etiology     Seizure Semiology: jerky movements bilateral UE/LE extremities with stiffness, generalized tonic clonic     Problem/Plan 1: Epilepsy - breakthrough seizures, recent covid infection and pneumonia. Was monitored on vEEG for past 2 days. Will discontinue vEEG today   [] vEEG notable for Left frontal spikes   [] Continue on Vimpat 50 mg BID, will obtain EKG prior to discharge   [] Brain MRI w/wo IV contrast - scheduled for 9 AM this morning  [x] Urine toxicology - negative       Problem/Plan 2: Positive COVID PCR - patient was recently infected with COVID 19, he is s/p treatment and still having positive PCR results.   [x] COVID19+ (per infection control this is shedding)    Core measures:   [x] Lovenox 40mg SubQ  [x] PT/OT -- no anticipated needs             35 yo M with pmh of seizures( 2nd grade treated with Tegretol for 6 months) and Hx of Covid + 2 weeks ago presents to [facility] for seizure. Patient went to bed around 1030 pm and woke up at 0400am to use the restroom and went back to bed. Per wife, around 0500am patient started to scream and started to have jerky movements in all extremities with arms being stiff and facing the ceiling. Soon after the patient started to foam from the mouth and then went back to sleep and snoring.  According to wife the episode lasted for 4 mins. The patient became awake after approx 7 mins when he saw himself surrounded by family and EMS. Patient CT head w/o contrast was negative. Patient will be admitted to EMU for continuos EEG monitoring  and will need to get brain MRI w/wo contrast at some point. Depending on EEG results, will decided on further management with AED.     Impression: Acute onset of general clonic seizure 2/2 to hx of seizures in the past vs recent COVID+ with pneumonia vs other etiology     Seizure Semiology: jerky movements bilateral UE/LE extremities with stiffness, generalized tonic clonic     Problem/Plan 1: Epilepsy - breakthrough seizures, recent covid infection and pneumonia. Was monitored on vEEG for past 2 days. Will discontinue vEEG today. Patient ready for discharge, stable.   [x] vEEG notable for Left frontal spikes   [x] Continue on Vimpat 50 mg BID - medication is ready for pickup at [facility]   [x] Brain MRI w/wo IV contrast - performed, results noted above.   [x] Urine toxicology - negative   [x] No driving or operating heavy machinery for at least 3 months to a year, patient must be seizure free for at least 3 months prior to resuming driving. Must see Neurologist for clearance.       Problem/Plan 2: Positive COVID PCR - patient was recently infected with COVID 19, he is s/p treatment and still having positive PCR results.   [x] COVID19+ (per infection control this is shedding)  [x] Continue to monitor for any reoccurring symptoms. He remains symptom free and afebrile for now.     Core measures:   [x] Lovenox 40mg SubQ  [x] PT/OT -- no anticipated needs       Plan discussed with Dr. Castillo and patient/family updated. All questions and concerns addressed. Patient endorses full understanding. Will follow up with Neurologist in 2 weeks.

## 2021-02-26 NOTE — PROGRESS NOTE ADULT - ATTENDING COMMENTS
improved,  EEG w LF epileptogenic focus    Plan: start Vimpat 50 bid  continue monitoring    brain MRI w epilepsy protocol tomorrow   will dc tomorrow
Brain MRI w MTS  plan : dc home on Vimpat 50 BID consider increasing in 2 weeks to 100 bid    no driving for 3 months for now will reassess in the future once his seizure pattern will be established( nocturnal vs diurnal)

## 2021-02-27 ENCOUNTER — TRANSCRIPTION ENCOUNTER (OUTPATIENT)
Age: 36
End: 2021-02-27

## 2021-02-28 LAB
SARS-COV-2 IGG SERPL QL IA: POSITIVE
SARS-COV-2 IGM SERPL IA-ACNC: 7.56 INDEX — HIGH

## 2021-03-01 PROBLEM — R56.9 UNSPECIFIED CONVULSIONS: Chronic | Status: ACTIVE | Noted: 2021-02-24

## 2021-03-02 PROBLEM — Z00.00 ENCOUNTER FOR PREVENTIVE HEALTH EXAMINATION: Status: ACTIVE | Noted: 2021-03-02

## 2021-03-10 ENCOUNTER — APPOINTMENT (OUTPATIENT)
Dept: NEUROLOGY | Facility: CLINIC | Age: 36
End: 2021-03-10
Payer: COMMERCIAL

## 2021-03-10 VITALS
BODY MASS INDEX: 23.32 KG/M2 | DIASTOLIC BLOOD PRESSURE: 78 MMHG | HEIGHT: 65 IN | WEIGHT: 140 LBS | HEART RATE: 68 BPM | SYSTOLIC BLOOD PRESSURE: 116 MMHG

## 2021-03-10 VITALS — TEMPERATURE: 98 F

## 2021-03-10 DIAGNOSIS — Z78.9 OTHER SPECIFIED HEALTH STATUS: ICD-10-CM

## 2021-03-10 PROCEDURE — 99072 ADDL SUPL MATRL&STAF TM PHE: CPT

## 2021-03-10 PROCEDURE — 99214 OFFICE O/P EST MOD 30 MIN: CPT

## 2021-03-15 PROBLEM — Z78.9 CAFFEINE USE: Status: ACTIVE | Noted: 2021-03-15

## 2021-03-15 NOTE — HISTORY OF PRESENT ILLNESS
[FreeTextEntry1] :                         \par     \par *** 03/09/2021 ***     \par  \par BRANDAN ORELLANA is here for follow up. he was recently admitted for a seizure with prolong convulsions and postictal period.\par \par His history is as below:\par \par Hospital Course \par 35 yo M with pmh of seizures( 2nd grade treated with Tegretol for 6 months) and\par Hx of Covid + 2 weeks ago presents to [facility] for seizure. Patient went to bed\par around 1030 pm and woke up at 0400am to use the restroom and went back to bed.\par Per wife, around 0500am patient started to scream and started to have jerky\par movements in all extremities with arms being stiff and facing the ceiling. Soon\par after the patient started to foam from the mouth and then went back to sleep\par and snoring. According to wife the episode lasted for 4 mins. The patient\par became awake after approx 7 mins when he saw himself surrounded by family and\par EMS. Patient CT head w/o contrast was negative. Patient will be admitted to EMU\par for continuos EEG monitoring and will need to get brain MRI w/wo contrast at\par some point. Depending on EEG results, will decided on further management with\par AED. Seizure Semiology: jerky movements bilateral UE/LE extremities with\par stiffness, generalized tonic clonic\par \par Impression: Acute onset of general clonic seizure 2/2 to hx of seizures in the\par past vs recent COVID+ with pneumonia vs other etiology\par \par EEG SUMMARY/CLASSIFICATION 2/25/21:\par Abnormal EEG in the awake, drowsy and asleep states.\par - spike wave discharge, frequent, left frontal\par - sharp wave discharge, frequent left frontal\par \par MR Brain w/w/o IV contrast Epilepsy Protocol 2/26/21: Mild volume loss,\par findings suggestive of left hippocampal malrotation, decreased size left\par fornical column and left fornix posterior commissural fibers, minimal\par microvascular disease or migraine sequela, posterior fossa arachnoid cyst, no\par hemorrhage, restricted diffusion or midline shift.\par \par Patient was monitored on vEEG for 2 days, EEG reports consistent with left\par frontal spikes. Patient was started on Vimpat 50 mg BID. vEEG discontinued, MRI\par Brain w/wo IV contrast 2/26/21 results noted above. Urine toxiciology negative.\par COVID 19 PCR results positive secondary to recent infection, patient has\par already been treated for COVID 19. Per infection control this is shedding.\par Patient is currently stable and ready for discharge. He can follow up with\par Neurologist outpatient, continue taking AED as prescribed above. Please note no\par driving or operating of heavy machinery for 1 year or until cleared by\par Neurologist.\par \par \par \par \par Med Reconciliation:\par Recommended Post-Discharge VTE Prophylaxis No post-discharge thromboprophylaxis\par indicated.\par Medication Reconciliation Status Admission Reconciliation is Completed\par Discharge Reconciliation is Completed\par Discharge Medications Vimpat 50 mg oral tablet: 1 tab(s) orally 2 times a day\par MDD:100\par .\par ,\par ,\par \par Care Plan/Procedures:\par Goal(s) To get better and follow your care plan as instructed.\par Discharge Diagnoses, Assessment and Plan of Treatment PRINCIPAL DISCHARGE\par DIAGNOSIS\par Diagnosis: Seizure\par Assessment and Plan of Treatment: Please follow up with neurologist in next 2\par weeks. Continue taking medications as prescribed. Contact your Neurologist,\par Primary Care Provider or report to Emergency Room if you experience new or\par worsening symptoms. Monitor your blood pressure. You may be at risk for\par falling, make changes to your home to help you walk easier. No operation of\par motor vehicles or heavy machinery for 6 months to 1 year. Follow up with [facility].\par \par \par \par \par \par \par \par \par \par    \par       \par       \par

## 2021-03-15 NOTE — DISCUSSION/SUMMARY
[FreeTextEntry1] : BRANDAN ORELLANA is a 36 years old with childhood onset epilepsy, well controlled for 20 years and now with recent breakthrough possibly in association with his COVID infection.\par \par He had one nocturnal event only.\par \par Plan: continue Vimpat \par No driving for 3 months and needs to be reevaluated further\par If he will have another seizure and will follow the nocturnal pattern no driving restrictions will be imposed, but at this time it is to early to establish a pattern.\par His brain MRI showed MTS R side.\par \par f/u in 2 months\par

## 2021-04-23 ENCOUNTER — NON-APPOINTMENT (OUTPATIENT)
Age: 36
End: 2021-04-23

## 2021-06-11 ENCOUNTER — APPOINTMENT (OUTPATIENT)
Dept: NEUROLOGY | Facility: CLINIC | Age: 36
End: 2021-06-11
Payer: COMMERCIAL

## 2021-06-11 PROCEDURE — 99214 OFFICE O/P EST MOD 30 MIN: CPT | Mod: 95

## 2021-07-04 NOTE — HISTORY OF PRESENT ILLNESS
[FreeTextEntry1] :       *** 06/11/2021 ***      \par \par Appointment was conducted by video conference in place of cancelled appointment due to heighten concern over coronavirus infection.\par Verbal consent was given on *** 06/112021 ***  by the patient, who understand that tele-visits will be charged to insurance and may involve co-pay for patient\par Physician location home\par Patient location home\par Patient on call: Dr. Castillo , patient\par \par \par BRANDAN ORELLANA is here for follow up.    stable, no seizures         \par     \par *** 03/09/2021 ***     \par  \par BRANDAN ORELLANA is here for follow up. he was recently admitted for a seizure with prolong convulsions and postictal period.\par \par His history is as below:\par \par Hospital Course \par 35 yo M with pmh of seizures( 2nd grade treated with Tegretol for 6 months) and\par Hx of Covid + 2 weeks ago presents to [facility] for seizure. Patient went to bed\par around 1030 pm and woke up at 0400am to use the restroom and went back to bed.\par Per wife, around 0500am patient started to scream and started to have jerky\par movements in all extremities with arms being stiff and facing the ceiling. Soon\par after the patient started to foam from the mouth and then went back to sleep\par and snoring. According to wife the episode lasted for 4 mins. The patient\par became awake after approx 7 mins when he saw himself surrounded by family and\par EMS. Patient CT head w/o contrast was negative. Patient will be admitted to EMU\par for continuos EEG monitoring and will need to get brain MRI w/wo contrast at\par some point. Depending on EEG results, will decided on further management with\par AED. Seizure Semiology: jerky movements bilateral UE/LE extremities with\par stiffness, generalized tonic clonic\par \par Impression: Acute onset of general clonic seizure 2/2 to hx of seizures in the\par past vs recent COVID+ with pneumonia vs other etiology\par \par EEG SUMMARY/CLASSIFICATION 2/25/21:\par Abnormal EEG in the awake, drowsy and asleep states.\par - spike wave discharge, frequent, left frontal\par - sharp wave discharge, frequent left frontal\par \par MR Brain w/w/o IV contrast Epilepsy Protocol 2/26/21: Mild volume loss,\par findings suggestive of left hippocampal malrotation, decreased size left\par fornical column and left fornix posterior commissural fibers, minimal\par microvascular disease or migraine sequela, posterior fossa arachnoid cyst, no\par hemorrhage, restricted diffusion or midline shift.\par \par Patient was monitored on vEEG for 2 days, EEG reports consistent with left\par frontal spikes. Patient was started on Vimpat 50 mg BID. vEEG discontinued, MRI\par Brain w/wo IV contrast 2/26/21 results noted above. Urine toxiciology negative.\par COVID 19 PCR results positive secondary to recent infection, patient has\par already been treated for COVID 19. Per infection control this is shedding.\par Patient is currently stable and ready for discharge. He can follow up with\par Neurologist outpatient, continue taking AED as prescribed above. Please note no\par driving or operating of heavy machinery for 1 year or until cleared by\par Neurologist.\par \par \par \par \par Med Reconciliation:\par Recommended Post-Discharge VTE Prophylaxis No post-discharge thromboprophylaxis\par indicated.\par Medication Reconciliation Status Admission Reconciliation is Completed\par Discharge Reconciliation is Completed\par Discharge Medications Vimpat 50 mg oral tablet: 1 tab(s) orally 2 times a day\par MDD:100\par .\par ,\par ,\par \par Care Plan/Procedures:\par Goal(s) To get better and follow your care plan as instructed.\par Discharge Diagnoses, Assessment and Plan of Treatment PRINCIPAL DISCHARGE\par DIAGNOSIS\par Diagnosis: Seizure\par Assessment and Plan of Treatment: Please follow up with neurologist in next 2\par weeks. Continue taking medications as prescribed. Contact your Neurologist,\par Primary Care Provider or report to Emergency Room if you experience new or\par worsening symptoms. Monitor your blood pressure. You may be at risk for\par falling, make changes to your home to help you walk easier. No operation of\par motor vehicles or heavy machinery for 6 months to 1 year. Follow up with [facility].\par \par \par \par \par \par \par \par \par \par    \par       \par       \par

## 2021-11-14 ENCOUNTER — NON-APPOINTMENT (OUTPATIENT)
Age: 36
End: 2021-11-14

## 2021-11-15 ENCOUNTER — APPOINTMENT (OUTPATIENT)
Dept: NEUROLOGY | Facility: CLINIC | Age: 36
End: 2021-11-15
Payer: COMMERCIAL

## 2021-11-15 DIAGNOSIS — G40.209 LOCALIZATION-RELATED (FOCAL) (PARTIAL) SYMPTOMATIC EPILEPSY AND EPILEPTIC SYNDROMES WITH COMPLEX PARTIAL SEIZURES, NOT INTRACTABLE, W/OUT STATUS EPILEPTICUS: ICD-10-CM

## 2021-11-15 DIAGNOSIS — G40.109 LOCALIZATION-RELATED (FOCAL) (PARTIAL) SYMPTOMATIC EPILEPSY AND EPILEPTIC SYNDROMES WITH SIMPLE PARTIAL SEIZURES, NOT INTRACTABLE, W/OUT STATUS EPILEPTICUS: ICD-10-CM

## 2021-11-15 DIAGNOSIS — G93.81 LOCALIZATION-RELATED (FOCAL) (PARTIAL) SYMPTOMATIC EPILEPSY AND EPILEPTIC SYNDROMES WITH SIMPLE PARTIAL SEIZURES, NOT INTRACTABLE, W/OUT STATUS EPILEPTICUS: ICD-10-CM

## 2021-11-15 PROCEDURE — 99214 OFFICE O/P EST MOD 30 MIN: CPT | Mod: 95

## 2021-11-15 RX ORDER — LACOSAMIDE 50 MG/1
50 TABLET, FILM COATED ORAL
Qty: 90 | Refills: 4 | Status: DISCONTINUED | COMMUNITY
Start: 2021-03-10 | End: 2021-11-15

## 2021-11-15 RX ORDER — CENOBAMATE 12.5-25MG
14 X 12.5 MG & KIT ORAL
Qty: 28 | Refills: 0 | Status: ACTIVE | COMMUNITY
Start: 2021-11-15 | End: 1900-01-01

## 2021-11-27 PROBLEM — G40.109 EPILEPSY DUE TO MESIAL TEMPORAL SCLEROSIS: Status: ACTIVE | Noted: 2021-11-27

## 2021-11-27 PROBLEM — G40.209 FOCAL EPILEPSY WITH IMPAIRMENT OF CONSCIOUSNESS: Status: ACTIVE | Noted: 2021-03-10

## 2021-11-27 NOTE — HISTORY OF PRESENT ILLNESS
[FreeTextEntry1] : *** 11/15/2021 ***\par \par Appointment was conducted by video conference in place of cancelled appointment due to heighten concern over coronavirus infection.\par Verbal consent was given on *** 11/15/2021 ***  by the patient, who understand that tele-visits will be charged to insurance and may involve co-pay for patient\par Physician location home\par Patient location home\par Patient on call: Dr. Castillo , patient\par \par BRANDAN ORELLANA is seen for follow up. he had another sz shorter in duration. has MTS\par living in [place] and looking for a neurologist there\par \par      \par \par  *** 06/11/2021 ***      \par \par Appointment was conducted by video conference in place of cancelled appointment due to heighten concern over coronavirus infection.\par Verbal consent was given on *** 06/112021 ***  by the patient, who understand that tele-visits will be charged to insurance and may involve co-pay for patient\par Physician location home\par Patient location home\par Patient on call: Dr. Castillo , patient\par \par \par BRANDAN ORELLANA is here for follow up.    stable, no seizures         \par     \par *** 03/09/2021 ***     \par  \par BRANDAN ORELLANA is here for follow up. he was recently admitted for a seizure with prolong convulsions and postictal period.\par \par His history is as below:\par \par Hospital Course \par 37 yo M with pmh of seizures( 2nd grade treated with Tegretol for 6 months) and\par Hx of Covid + 2 weeks ago presents to [facility] for seizure. Patient went to bed\par around 1030 pm and woke up at 0400am to use the restroom and went back to bed.\par Per wife, around 0500am patient started to scream and started to have jerky\par movements in all extremities with arms being stiff and facing the ceiling. Soon\par after the patient started to foam from the mouth and then went back to sleep\par and snoring. According to wife the episode lasted for 4 mins. The patient\par became awake after approx 7 mins when he saw himself surrounded by family and\par EMS. Patient CT head w/o contrast was negative. Patient will be admitted to EMU\par for continuos EEG monitoring and will need to get brain MRI w/wo contrast at\par some point. Depending on EEG results, will decided on further management with\par AED. Seizure Semiology: jerky movements bilateral UE/LE extremities with\par stiffness, generalized tonic clonic\par \par Impression: Acute onset of general clonic seizure 2/2 to hx of seizures in the\par past vs recent COVID+ with pneumonia vs other etiology\par \par EEG SUMMARY/CLASSIFICATION 2/25/21:\par Abnormal EEG in the awake, drowsy and asleep states.\par - spike wave discharge, frequent, left frontal\par - sharp wave discharge, frequent left frontal\par \par MR Brain w/w/o IV contrast Epilepsy Protocol 2/26/21: Mild volume loss,\par findings suggestive of left hippocampal malrotation, decreased size left\par fornical column and left fornix posterior commissural fibers, minimal\par microvascular disease or migraine sequela, posterior fossa arachnoid cyst, no\par hemorrhage, restricted diffusion or midline shift.\par \par Patient was monitored on vEEG for 2 days, EEG reports consistent with left\par frontal spikes. Patient was started on Vimpat 50 mg BID. vEEG discontinued, MRI\par Brain w/wo IV contrast 2/26/21 results noted above. Urine toxiciology negative.\par COVID 19 PCR results positive secondary to recent infection, patient has\par already been treated for COVID 19. Per infection control this is shedding.\par Patient is currently stable and ready for discharge. He can follow up with\par Neurologist outpatient, continue taking AED as prescribed above. Please note no\par driving or operating of heavy machinery for 1 year or until cleared by\par Neurologist.\par \par \par \par \par Med Reconciliation:\par Recommended Post-Discharge VTE Prophylaxis No post-discharge thromboprophylaxis\par indicated.\par Medication Reconciliation Status Admission Reconciliation is Completed\par Discharge Reconciliation is Completed\par Discharge Medications Vimpat 50 mg oral tablet: 1 tab(s) orally 2 times a day\par MDD:100\par .\par ,\par ,\par \par Care Plan/Procedures:\par Goal(s) To get better and follow your care plan as instructed.\par Discharge Diagnoses, Assessment and Plan of Treatment PRINCIPAL DISCHARGE\par DIAGNOSIS\par Diagnosis: Seizure\par Assessment and Plan of Treatment: Please follow up with neurologist in next 2\par weeks. Continue taking medications as prescribed. Contact your Neurologist,\par Primary Care Provider or report to Emergency Room if you experience new or\par worsening symptoms. Monitor your blood pressure. You may be at risk for\par falling, make changes to your home to help you walk easier. No operation of\par motor vehicles or heavy machinery for 6 months to 1 year. Follow up with [facility].\par \par \par \par \par \par \par \par \par \par    \par       \par       \par

## 2021-11-27 NOTE — DISCUSSION/SUMMARY
[FreeTextEntry1] : BRANDAN ORELLANA is a 36 years old with childhood onset epilepsy, well controlled for 20 years and now with recent breakthrough possibly in association with his COVID infection.\par \par He had one nocturnal event only.\par \par Plan: continue Vimpat 150 bid. start xcopri 12.5-25 in order to replace vimpat in the future\par No driving for 3 months and needs to be reevaluated further\par If he will have another seizure and will follow the nocturnal pattern no driving restrictions will be imposed, but at this time it is to early to establish a pattern.\par His brain MRI showed MTS R side.\par \par f/u in 2 months\par

## 2022-03-07 RX ORDER — LACOSAMIDE 150 MG/1
150 TABLET, FILM COATED ORAL
Qty: 180 | Refills: 1 | Status: ACTIVE | COMMUNITY
Start: 2021-11-15 | End: 1900-01-01

## 2023-11-27 NOTE — ED PROVIDER NOTE - ATTENDING CONTRIBUTION TO CARE
36 Yr old male with prior history of Seizure when was little, here for seizure like episode at 5 AM witnessed by his wife to call for collateral, no tongue biting or urinary incontinence, non focal neuro exam, neuro consulted with help about what sz med to start and arrange follow up as he hasn't has sz since has 7-8 and not on sz meds. labs, ct head ordered.
no

## 2024-11-14 NOTE — ED ADULT NURSE NOTE - IS THE PATIENT ABLE TO BE SCREENED?
----- Message from Brayan Coy DO sent at 11/14/2024  7:09 AM CST -----  I am happy to say his recent laboratory overall was stable.  We are still awaiting the NMR LipoProfile to evaluate the size of his lipid particles.   Yes